# Patient Record
Sex: MALE | Race: WHITE | NOT HISPANIC OR LATINO | URBAN - METROPOLITAN AREA
[De-identification: names, ages, dates, MRNs, and addresses within clinical notes are randomized per-mention and may not be internally consistent; named-entity substitution may affect disease eponyms.]

---

## 2022-06-26 RX ORDER — ISOSORBIDE MONONITRATE 30 MG/1
TABLET, EXTENDED RELEASE ORAL
COMMUNITY

## 2022-06-26 RX ORDER — CLOPIDOGREL BISULFATE 75 MG/1
TABLET ORAL
COMMUNITY

## 2022-06-26 RX ORDER — LISINOPRIL 20 MG/1
TABLET ORAL
COMMUNITY
End: 2022-10-11 | Stop reason: SDUPTHER

## 2022-06-26 RX ORDER — METFORMIN HYDROCHLORIDE 500 MG/1
TABLET, EXTENDED RELEASE ORAL
COMMUNITY
Start: 2021-08-30 | End: 2022-09-13

## 2022-06-26 RX ORDER — SUMATRIPTAN 50 MG/1
TABLET, FILM COATED ORAL
COMMUNITY
End: 2022-10-21 | Stop reason: SDUPTHER

## 2022-06-26 RX ORDER — ATORVASTATIN CALCIUM 40 MG/1
TABLET, FILM COATED ORAL
COMMUNITY

## 2022-11-30 ENCOUNTER — NEW PATIENT (OUTPATIENT)
Dept: URBAN - METROPOLITAN AREA CLINIC 4 | Facility: CLINIC | Age: 72
End: 2022-11-30

## 2022-11-30 DIAGNOSIS — H11.153: ICD-10-CM

## 2022-11-30 DIAGNOSIS — H25.13: ICD-10-CM

## 2022-11-30 PROCEDURE — 92015 DETERMINE REFRACTIVE STATE: CPT

## 2022-11-30 PROCEDURE — 92004 COMPRE OPH EXAM NEW PT 1/>: CPT

## 2022-11-30 ASSESSMENT — KERATOMETRY
OS_K1POWER_DIOPTERS: 44.00
OD_AXISANGLE_DEGREES: 099
OS_AXISANGLE_DEGREES: 069
OS_AXISANGLE2_DEGREES: 159
OD_AXISANGLE2_DEGREES: 9
OS_K2POWER_DIOPTERS: 45.50
OD_K1POWER_DIOPTERS: 43.25
OD_K2POWER_DIOPTERS: 45.50

## 2022-11-30 ASSESSMENT — TONOMETRY
OD_IOP_MMHG: 12
OS_IOP_MMHG: 13

## 2022-11-30 ASSESSMENT — VISUAL ACUITY
OD_CC: 20/25-1
OS_GLARE: 20/50-1
OS_CC: 20/25-2
OD_GLARE: 20/50-1

## 2023-12-13 ENCOUNTER — ESTABLISHED PATIENT (OUTPATIENT)
Facility: LOCATION | Age: 73
End: 2023-12-13

## 2023-12-13 DIAGNOSIS — H25.13: ICD-10-CM

## 2023-12-13 DIAGNOSIS — E11.9: ICD-10-CM

## 2023-12-13 DIAGNOSIS — H11.153: ICD-10-CM

## 2023-12-13 PROCEDURE — 92015 DETERMINE REFRACTIVE STATE: CPT

## 2023-12-13 PROCEDURE — 92014 COMPRE OPH EXAM EST PT 1/>: CPT

## 2023-12-13 ASSESSMENT — VISUAL ACUITY
OS_CC: 20/50+2
OD_GLARE: 20/50-1
OS_GLARE: 20/60
OD_CC: 20/40+2
OU_CC: 20/40+1

## 2023-12-13 ASSESSMENT — KERATOMETRY
OD_AXISANGLE_DEGREES: 100
OD_K2POWER_DIOPTERS: 45.25
OS_AXISANGLE_DEGREES: 76
OS_K2POWER_DIOPTERS: 45.25
OS_AXISANGLE2_DEGREES: 166
OD_AXISANGLE2_DEGREES: 10
OS_K1POWER_DIOPTERS: 44.25
OD_K1POWER_DIOPTERS: 43.50

## 2023-12-13 ASSESSMENT — TONOMETRY
OS_IOP_MMHG: 14
OD_IOP_MMHG: 13

## 2023-12-18 ENCOUNTER — PRE-OP/H&P (OUTPATIENT)
Facility: LOCATION | Age: 73
End: 2023-12-18

## 2023-12-18 VITALS — SYSTOLIC BLOOD PRESSURE: 109 MMHG | DIASTOLIC BLOOD PRESSURE: 62 MMHG | HEIGHT: 60 IN | HEART RATE: 88 BPM

## 2023-12-18 DIAGNOSIS — H25.13: ICD-10-CM

## 2023-12-18 PROCEDURE — 92136 OPHTHALMIC BIOMETRY: CPT

## 2023-12-18 PROCEDURE — 99211PRE PRE OP VISIT

## 2023-12-26 PROBLEM — Z96.1: Noted: 2023-12-26

## 2023-12-27 ENCOUNTER — PRE-OP/H&P (OUTPATIENT)
Facility: LOCATION | Age: 73
End: 2023-12-27

## 2023-12-27 DIAGNOSIS — Z96.1: ICD-10-CM

## 2023-12-27 PROCEDURE — 99024 POSTOP FOLLOW-UP VISIT: CPT

## 2023-12-27 ASSESSMENT — TONOMETRY: OS_IOP_MMHG: 13

## 2023-12-27 ASSESSMENT — VISUAL ACUITY: OS_SC: 20/50-1

## 2024-01-08 ENCOUNTER — POST OP/EVAL OF SECOND EYE (OUTPATIENT)
Facility: LOCATION | Age: 74
End: 2024-01-08

## 2024-01-08 DIAGNOSIS — H25.11: ICD-10-CM

## 2024-01-08 DIAGNOSIS — Z96.1: ICD-10-CM

## 2024-01-08 PROCEDURE — 92136 OPHTHALMIC BIOMETRY: CPT

## 2024-01-08 PROCEDURE — 99024 POSTOP FOLLOW-UP VISIT: CPT

## 2024-01-08 ASSESSMENT — VISUAL ACUITY
OS_SC: 20/20
OU_SC: 20/20

## 2024-01-08 ASSESSMENT — TONOMETRY: OS_IOP_MMHG: 10

## 2024-01-23 PROBLEM — Z96.1: Noted: 2023-12-26

## 2024-01-23 PROBLEM — Z96.1: Noted: 2024-01-23

## 2024-01-24 ENCOUNTER — POST-OP (OUTPATIENT)
Facility: LOCATION | Age: 74
End: 2024-01-24

## 2024-01-24 DIAGNOSIS — Z96.1: ICD-10-CM

## 2024-01-24 ASSESSMENT — TONOMETRY: OD_IOP_MMHG: 18

## 2024-01-24 ASSESSMENT — VISUAL ACUITY: OD_SC: 20/20-2

## 2024-02-08 ENCOUNTER — POST-OP (OUTPATIENT)
Facility: LOCATION | Age: 74
End: 2024-02-08

## 2024-02-08 DIAGNOSIS — Z96.1: ICD-10-CM

## 2024-02-08 PROCEDURE — 99024 POSTOP FOLLOW-UP VISIT: CPT

## 2024-02-08 ASSESSMENT — TONOMETRY
OS_IOP_MMHG: 13
OD_IOP_MMHG: 12

## 2024-02-08 ASSESSMENT — KERATOMETRY
OS_K2POWER_DIOPTERS: 45.25
OS_AXISANGLE2_DEGREES: 158
OD_K1POWER_DIOPTERS: 42.25
OD_AXISANGLE2_DEGREES: 7
OS_K1POWER_DIOPTERS: 43.75
OS_AXISANGLE_DEGREES: 68
OD_AXISANGLE_DEGREES: 97
OD_K2POWER_DIOPTERS: 45.25

## 2024-02-08 ASSESSMENT — VISUAL ACUITY
OD_SC: 20/20-2
OU_SC: 20/20
OS_SC: 20/20

## 2024-04-03 ENCOUNTER — ESTABLISHED PATIENT (OUTPATIENT)
Facility: LOCATION | Age: 74
End: 2024-04-03

## 2024-04-03 DIAGNOSIS — H43.813: ICD-10-CM

## 2024-04-03 PROCEDURE — 92134 CPTRZ OPH DX IMG PST SGM RTA: CPT

## 2024-04-03 PROCEDURE — 99214 OFFICE O/P EST MOD 30 MIN: CPT | Mod: 24

## 2024-04-03 ASSESSMENT — VISUAL ACUITY
OS_SC: 20/30+2
OD_SC: 20/30+2
OS_CC: 20/25
OD_CC: 20/25
OU_CC: 20/25+2

## 2024-04-03 ASSESSMENT — TONOMETRY
OD_IOP_MMHG: 6
OS_IOP_MMHG: 8

## 2024-05-08 ENCOUNTER — ESTABLISHED PATIENT (OUTPATIENT)
Facility: LOCATION | Age: 74
End: 2024-05-08

## 2024-05-08 DIAGNOSIS — H43.813: ICD-10-CM

## 2024-05-08 DIAGNOSIS — H10.13: ICD-10-CM

## 2024-05-08 DIAGNOSIS — H02.883: ICD-10-CM

## 2024-05-08 DIAGNOSIS — H02.886: ICD-10-CM

## 2024-05-08 PROCEDURE — 99214 OFFICE O/P EST MOD 30 MIN: CPT

## 2024-05-08 PROCEDURE — 92134 CPTRZ OPH DX IMG PST SGM RTA: CPT

## 2024-05-08 ASSESSMENT — TONOMETRY
OD_IOP_MMHG: 14
OS_IOP_MMHG: 15

## 2024-05-08 ASSESSMENT — VISUAL ACUITY
OD_PH: 20/25
OS_SC: 20/25
OD_SC: 20/30